# Patient Record
(demographics unavailable — no encounter records)

---

## 2025-04-18 NOTE — HISTORY OF PRESENT ILLNESS
[Y] : Positive pregnancy history [Currently Active] : currently active [Men] : men [Vaginal] : vaginal [Mammogramdate] : 2023 [TextBox_19] : LIZ [LMPDate] : 03/27/25 [PapSmeardate] : 2023 [PGHxTotal] : 2 [[facility] [[facility] : 2 [FreeTextEntry1] :  &